# Patient Record
Sex: MALE | Race: OTHER | HISPANIC OR LATINO | URBAN - METROPOLITAN AREA
[De-identification: names, ages, dates, MRNs, and addresses within clinical notes are randomized per-mention and may not be internally consistent; named-entity substitution may affect disease eponyms.]

---

## 2024-06-08 ENCOUNTER — OUTPATIENT (OUTPATIENT)
Dept: EMERGENCY DEPT | Facility: HOSPITAL | Age: 29
LOS: 1 days | Discharge: HOME | End: 2024-06-08
Payer: MEDICAID

## 2024-06-08 VITALS
OXYGEN SATURATION: 96 % | HEART RATE: 54 BPM | SYSTOLIC BLOOD PRESSURE: 125 MMHG | RESPIRATION RATE: 18 BRPM | DIASTOLIC BLOOD PRESSURE: 75 MMHG | TEMPERATURE: 97 F

## 2024-06-08 VITALS
WEIGHT: 214.07 LBS | HEART RATE: 60 BPM | SYSTOLIC BLOOD PRESSURE: 134 MMHG | OXYGEN SATURATION: 98 % | DIASTOLIC BLOOD PRESSURE: 70 MMHG | RESPIRATION RATE: 18 BRPM | TEMPERATURE: 99 F

## 2024-06-08 DIAGNOSIS — K35.80 UNSPECIFIED ACUTE APPENDICITIS: ICD-10-CM

## 2024-06-08 LAB
ALBUMIN SERPL ELPH-MCNC: 4.6 G/DL — SIGNIFICANT CHANGE UP (ref 3.5–5.2)
ALP SERPL-CCNC: 109 U/L — SIGNIFICANT CHANGE UP (ref 30–115)
ALT FLD-CCNC: 19 U/L — SIGNIFICANT CHANGE UP (ref 0–41)
ANION GAP SERPL CALC-SCNC: 15 MMOL/L — HIGH (ref 7–14)
APPEARANCE UR: CLEAR — SIGNIFICANT CHANGE UP
AST SERPL-CCNC: 19 U/L — SIGNIFICANT CHANGE UP (ref 0–41)
BASOPHILS # BLD AUTO: 0.04 K/UL — SIGNIFICANT CHANGE UP (ref 0–0.2)
BASOPHILS NFR BLD AUTO: 0.4 % — SIGNIFICANT CHANGE UP (ref 0–1)
BILIRUB SERPL-MCNC: 1.8 MG/DL — HIGH (ref 0.2–1.2)
BILIRUB UR-MCNC: NEGATIVE — SIGNIFICANT CHANGE UP
BUN SERPL-MCNC: 21 MG/DL — HIGH (ref 10–20)
CALCIUM SERPL-MCNC: 9 MG/DL — SIGNIFICANT CHANGE UP (ref 8.4–10.5)
CHLORIDE SERPL-SCNC: 96 MMOL/L — LOW (ref 98–110)
CO2 SERPL-SCNC: 25 MMOL/L — SIGNIFICANT CHANGE UP (ref 17–32)
COLOR SPEC: SIGNIFICANT CHANGE UP
CREAT SERPL-MCNC: 0.8 MG/DL — SIGNIFICANT CHANGE UP (ref 0.7–1.5)
DIFF PNL FLD: NEGATIVE — SIGNIFICANT CHANGE UP
EGFR: 125 ML/MIN/1.73M2 — SIGNIFICANT CHANGE UP
EOSINOPHIL # BLD AUTO: 0.12 K/UL — SIGNIFICANT CHANGE UP (ref 0–0.7)
EOSINOPHIL NFR BLD AUTO: 1.1 % — SIGNIFICANT CHANGE UP (ref 0–8)
GLUCOSE SERPL-MCNC: 111 MG/DL — HIGH (ref 70–99)
GLUCOSE UR QL: NEGATIVE MG/DL — SIGNIFICANT CHANGE UP
HCT VFR BLD CALC: 42.3 % — SIGNIFICANT CHANGE UP (ref 42–52)
HGB BLD-MCNC: 14.4 G/DL — SIGNIFICANT CHANGE UP (ref 14–18)
IMM GRANULOCYTES NFR BLD AUTO: 0.4 % — HIGH (ref 0.1–0.3)
KETONES UR-MCNC: 40 MG/DL
LACTATE SERPL-SCNC: 0.9 MMOL/L — SIGNIFICANT CHANGE UP (ref 0.7–2)
LEUKOCYTE ESTERASE UR-ACNC: NEGATIVE — SIGNIFICANT CHANGE UP
LIDOCAIN IGE QN: 18 U/L — SIGNIFICANT CHANGE UP (ref 7–60)
LYMPHOCYTES # BLD AUTO: 1.65 K/UL — SIGNIFICANT CHANGE UP (ref 1.2–3.4)
LYMPHOCYTES # BLD AUTO: 14.6 % — LOW (ref 20.5–51.1)
MCHC RBC-ENTMCNC: 30 PG — SIGNIFICANT CHANGE UP (ref 27–31)
MCHC RBC-ENTMCNC: 34 G/DL — SIGNIFICANT CHANGE UP (ref 32–37)
MCV RBC AUTO: 88.1 FL — SIGNIFICANT CHANGE UP (ref 80–94)
MONOCYTES # BLD AUTO: 0.67 K/UL — HIGH (ref 0.1–0.6)
MONOCYTES NFR BLD AUTO: 5.9 % — SIGNIFICANT CHANGE UP (ref 1.7–9.3)
NEUTROPHILS # BLD AUTO: 8.77 K/UL — HIGH (ref 1.4–6.5)
NEUTROPHILS NFR BLD AUTO: 77.6 % — HIGH (ref 42.2–75.2)
NITRITE UR-MCNC: NEGATIVE — SIGNIFICANT CHANGE UP
NRBC # BLD: 0 /100 WBCS — SIGNIFICANT CHANGE UP (ref 0–0)
PH UR: 5.5 — SIGNIFICANT CHANGE UP (ref 5–8)
PLATELET # BLD AUTO: 274 K/UL — SIGNIFICANT CHANGE UP (ref 130–400)
PMV BLD: 9.3 FL — SIGNIFICANT CHANGE UP (ref 7.4–10.4)
POTASSIUM SERPL-MCNC: 4.3 MMOL/L — SIGNIFICANT CHANGE UP (ref 3.5–5)
POTASSIUM SERPL-SCNC: 4.3 MMOL/L — SIGNIFICANT CHANGE UP (ref 3.5–5)
PROT SERPL-MCNC: 7 G/DL — SIGNIFICANT CHANGE UP (ref 6–8)
PROT UR-MCNC: SIGNIFICANT CHANGE UP MG/DL
RBC # BLD: 4.8 M/UL — SIGNIFICANT CHANGE UP (ref 4.7–6.1)
RBC # FLD: 12.1 % — SIGNIFICANT CHANGE UP (ref 11.5–14.5)
SODIUM SERPL-SCNC: 136 MMOL/L — SIGNIFICANT CHANGE UP (ref 135–146)
SP GR SPEC: >1.03 — HIGH (ref 1–1.03)
UROBILINOGEN FLD QL: 1 MG/DL — SIGNIFICANT CHANGE UP (ref 0.2–1)
WBC # BLD: 11.29 K/UL — HIGH (ref 4.8–10.8)
WBC # FLD AUTO: 11.29 K/UL — HIGH (ref 4.8–10.8)

## 2024-06-08 PROCEDURE — 93970 EXTREMITY STUDY: CPT

## 2024-06-08 PROCEDURE — 88304 TISSUE EXAM BY PATHOLOGIST: CPT | Mod: 26

## 2024-06-08 PROCEDURE — C1889: CPT

## 2024-06-08 PROCEDURE — 88304 TISSUE EXAM BY PATHOLOGIST: CPT

## 2024-06-08 PROCEDURE — 93970 EXTREMITY STUDY: CPT | Mod: 26

## 2024-06-08 PROCEDURE — 99221 1ST HOSP IP/OBS SF/LOW 40: CPT

## 2024-06-08 PROCEDURE — 44970 LAPAROSCOPY APPENDECTOMY: CPT

## 2024-06-08 PROCEDURE — 99285 EMERGENCY DEPT VISIT HI MDM: CPT

## 2024-06-08 PROCEDURE — 74177 CT ABD & PELVIS W/CONTRAST: CPT | Mod: 26,MC

## 2024-06-08 PROCEDURE — 44970 LAPAROSCOPY APPENDECTOMY: CPT | Mod: AS

## 2024-06-08 PROCEDURE — C9399: CPT

## 2024-06-08 RX ORDER — ONDANSETRON 8 MG/1
4 TABLET, FILM COATED ORAL EVERY 6 HOURS
Refills: 0 | Status: DISCONTINUED | OUTPATIENT
Start: 2024-06-08 | End: 2024-06-08

## 2024-06-08 RX ORDER — PIPERACILLIN AND TAZOBACTAM 4; .5 G/20ML; G/20ML
3.38 INJECTION, POWDER, LYOPHILIZED, FOR SOLUTION INTRAVENOUS ONCE
Refills: 0 | Status: DISCONTINUED | OUTPATIENT
Start: 2024-06-08 | End: 2024-06-08

## 2024-06-08 RX ORDER — SODIUM CHLORIDE 9 MG/ML
1000 INJECTION, SOLUTION INTRAVENOUS
Refills: 0 | Status: DISCONTINUED | OUTPATIENT
Start: 2024-06-08 | End: 2024-06-08

## 2024-06-08 RX ORDER — PIPERACILLIN AND TAZOBACTAM 4; .5 G/20ML; G/20ML
3.38 INJECTION, POWDER, LYOPHILIZED, FOR SOLUTION INTRAVENOUS ONCE
Refills: 0 | Status: CANCELLED | OUTPATIENT
Start: 2024-06-09 | End: 2024-06-08

## 2024-06-08 RX ORDER — ACETAMINOPHEN 500 MG
650 TABLET ORAL EVERY 6 HOURS
Refills: 0 | Status: DISCONTINUED | OUTPATIENT
Start: 2024-06-08 | End: 2024-06-08

## 2024-06-08 RX ORDER — MORPHINE SULFATE 50 MG/1
4 CAPSULE, EXTENDED RELEASE ORAL ONCE
Refills: 0 | Status: DISCONTINUED | OUTPATIENT
Start: 2024-06-08 | End: 2024-06-08

## 2024-06-08 RX ORDER — MORPHINE SULFATE 50 MG/1
4 CAPSULE, EXTENDED RELEASE ORAL
Refills: 0 | Status: DISCONTINUED | OUTPATIENT
Start: 2024-06-08 | End: 2024-06-08

## 2024-06-08 RX ORDER — PIPERACILLIN AND TAZOBACTAM 4; .5 G/20ML; G/20ML
3.38 INJECTION, POWDER, LYOPHILIZED, FOR SOLUTION INTRAVENOUS ONCE
Refills: 0 | Status: COMPLETED | OUTPATIENT
Start: 2024-06-08 | End: 2024-06-08

## 2024-06-08 RX ORDER — MORPHINE SULFATE 50 MG/1
2 CAPSULE, EXTENDED RELEASE ORAL EVERY 4 HOURS
Refills: 0 | Status: DISCONTINUED | OUTPATIENT
Start: 2024-06-08 | End: 2024-06-08

## 2024-06-08 RX ORDER — SODIUM CHLORIDE 9 MG/ML
1000 INJECTION INTRAMUSCULAR; INTRAVENOUS; SUBCUTANEOUS
Refills: 0 | Status: DISCONTINUED | OUTPATIENT
Start: 2024-06-08 | End: 2024-06-08

## 2024-06-08 RX ORDER — PIPERACILLIN AND TAZOBACTAM 4; .5 G/20ML; G/20ML
3.38 INJECTION, POWDER, LYOPHILIZED, FOR SOLUTION INTRAVENOUS EVERY 8 HOURS
Refills: 0 | Status: CANCELLED | OUTPATIENT
Start: 2024-06-09 | End: 2024-06-08

## 2024-06-08 RX ORDER — ONDANSETRON 8 MG/1
4 TABLET, FILM COATED ORAL ONCE
Refills: 0 | Status: DISCONTINUED | OUTPATIENT
Start: 2024-06-08 | End: 2024-06-08

## 2024-06-08 RX ORDER — ACETAMINOPHEN 500 MG
2 TABLET ORAL
Qty: 0 | Refills: 0 | DISCHARGE
Start: 2024-06-08

## 2024-06-08 RX ORDER — SODIUM CHLORIDE 9 MG/ML
1000 INJECTION INTRAMUSCULAR; INTRAVENOUS; SUBCUTANEOUS ONCE
Refills: 0 | Status: COMPLETED | OUTPATIENT
Start: 2024-06-08 | End: 2024-06-08

## 2024-06-08 RX ORDER — IOHEXOL 300 MG/ML
30 INJECTION, SOLUTION INTRAVENOUS ONCE
Refills: 0 | Status: COMPLETED | OUTPATIENT
Start: 2024-06-08 | End: 2024-06-08

## 2024-06-08 RX ADMIN — MORPHINE SULFATE 4 MILLIGRAM(S): 50 CAPSULE, EXTENDED RELEASE ORAL at 12:11

## 2024-06-08 RX ADMIN — IOHEXOL 30 MILLILITER(S): 300 INJECTION, SOLUTION INTRAVENOUS at 04:00

## 2024-06-08 RX ADMIN — Medication 650 MILLIGRAM(S): at 17:56

## 2024-06-08 RX ADMIN — MORPHINE SULFATE 4 MILLIGRAM(S): 50 CAPSULE, EXTENDED RELEASE ORAL at 11:47

## 2024-06-08 RX ADMIN — MORPHINE SULFATE 4 MILLIGRAM(S): 50 CAPSULE, EXTENDED RELEASE ORAL at 12:05

## 2024-06-08 RX ADMIN — SODIUM CHLORIDE 1000 MILLILITER(S): 9 INJECTION INTRAMUSCULAR; INTRAVENOUS; SUBCUTANEOUS at 04:00

## 2024-06-08 RX ADMIN — MORPHINE SULFATE 4 MILLIGRAM(S): 50 CAPSULE, EXTENDED RELEASE ORAL at 12:30

## 2024-06-08 RX ADMIN — PIPERACILLIN AND TAZOBACTAM 200 GRAM(S): 4; .5 INJECTION, POWDER, LYOPHILIZED, FOR SOLUTION INTRAVENOUS at 06:49

## 2024-06-08 NOTE — PATIENT PROFILE ADULT - FALL HARM RISK - HARM RISK INTERVENTIONS

## 2024-06-08 NOTE — ED PROVIDER NOTE - PHYSICAL EXAMINATION
CONST: Well appearing in NAD  EYES: PERRL, EOMI, Sclera and conjunctiva clear.   ENT: Oropharynx normal appearing, no erythema or exudates. Uvula midline.  CARD: Normal S1 S2; Normal rate and rhythm  RESP: Equal BS B/L, No wheezes, rhonchi or rales. No distress  GI: Lower abdominal quadrants with tenderness to palpation.  Abdomen soft, nondistended.  No rebound or guarding.  MS: Normal ROM in all extremities. No midline spinal tenderness.  SKIN: Warm, dry, no acute rashes.   NEURO: A&Ox3, No focal deficits. Strength 5/5 with no sensory deficits. Steady gait

## 2024-06-08 NOTE — DISCHARGE NOTE PROVIDER - HOSPITAL COURSE
HPI:  · Chief Complaint: The patient is a 28y Male complaining of abdominal pain.  · HPI Objective Statement: 28-year-old male no reported past medical history presents to the ED complain abdominal pain.  Patient with 3 days of epigastric abdominal pain, non-radiating, no palliating factors.  Pain is worse with movement.  Pain worsened acutely this evening prompting today's ED eval.  Denies any nausea, vomiting, fevers, chills, diarrhea, dysuria, hematuria, penile or testicular pain.  (08 Jun 2024 07:02).      Patient had a CT scan abdomen and pelvis in ER which was c/w acute appendicitis.  Patient with WBC count of 11.2.   CT Abdomen and Pelvis w/ Oral Cont and w/ IV Cont (06.08.24 @ 06:28)   IMPRESSION:  Acute appendicitis without evidence of perforation or abscess.        6/8/2024:  Patient was admitted to surgical service under Dr. Hussein's care with diagnosis of acute appendicitis.    Patient was evaluated by Dr. Hussein and he recommended taking to the OR for Laparoscopic Appendectomy.  Patient was taken to the OR by Dr. Hussein and underwent a Laparoscopic Appendectomy on 6/8/2024.  Patient tolerated procedure well without complications.   Patient had some post-operative calf tenderness so a Venous duplex doppler was completed on 6/8/2024 and was negative for any DVT.   Patient's pain controlled with Tylenol,  vital signs remained stable and patient remained afebrile,  voided freely, + flatus,  tolerated diet without increased pain or N/V,  ambulated post operatively and was deemed stable for discharge by Dr. Hussein.    6/8/2024:  Discharged home in stable condition with no D/C Rx's needed and was instructed to follow up in 2 weeks with Dr. Gavin.

## 2024-06-08 NOTE — ED PROVIDER NOTE - OBJECTIVE STATEMENT
28-year-old male no reported past medical history presents to the ED complain abdominal pain.  Patient with 3 days of epigastric abdominal pain, nonradiating, no palliating factors.  Pain is worse with movement.  Pain worsened acutely this evening prompting today's ED eval.  Denies any nausea, vomiting, fevers, chills, diarrhea, dysuria, hematuria, penile or testicular pain.

## 2024-06-08 NOTE — H&P ADULT - NSHPPHYSICALEXAM_GEN_ALL_CORE
Vital Signs Last 24 Hrs  T(C): 36.6 (08 Jun 2024 06:22), Max: 37.1 (08 Jun 2024 03:37)  T(F): 97.9 (08 Jun 2024 06:22), Max: 98.7 (08 Jun 2024 03:37)  HR: 60 (08 Jun 2024 06:22) (60 - 60)  BP: 127/76 (08 Jun 2024 06:22) (127/76 - 134/70)  BP(mean): --  RR: 18 (08 Jun 2024 06:22) (18 - 18)  SpO2: 100% (08 Jun 2024 06:22) (98% - 100%)    Parameters below as of 08 Jun 2024 06:22  Patient On (Oxygen Delivery Method): room air

## 2024-06-08 NOTE — DISCHARGE NOTE PROVIDER - NSDCFUADDINST_GEN_ALL_CORE_FT
- Diet: Continue your regular diet. - Drink plenty of fluids. - Ambulate frequently at home.   - No further antibiotics needed.  - Dressings:  You may use bandaids to cover incisions as needed. Ok to shower normally on Monday.  No soaking in bath/pool/ocean for 6 weeks. - Pain: Take Ibuprofen 500mg and/or Tylenol 650mg as needed for pain.  - Activity:  No weight lifting and Avoid heavy lifting (anything over 10 pounds) for at least 6 weeks.  You may do light cardio exercise in 1 week if feel well.  - Follow up:  You should call Dr. Gavin's office on Monday to arrange a follow up appointment for 2 weeks.    * Return to Emergency room if develop any persistent fever > 101, chills, tremors, persistent nausea/vomiting, severe pain not relieved by pain medication, inability to urinate, chest pains, difficulty breathing or any bleeding.

## 2024-06-08 NOTE — H&P ADULT - HISTORY OF PRESENT ILLNESS
· Chief Complaint: The patient is a 28y Male complaining of abdominal pain.  · HPI Objective Statement: 28-year-old male no reported past medical history presents to the ED complain abdominal pain.  Patient with 3 days of epigastric abdominal pain, nonradiating, no palliating factors.  Pain is worse with movement.  Pain worsened acutely this evening prompting today's ED eval.  Denies any nausea, vomiting, fevers, chills, diarrhea, dysuria, hematuria, penile or testicular pain.

## 2024-06-08 NOTE — H&P ADULT - NS ATTEND AMEND GEN_ALL_CORE FT
Patient seen and examined with surgery PA in preop area and discussed management plans.. Localized RLQ tenderness mild. Lab and CT exams reviewed consistent with acute appendicitis. Informed consent was obtained patient to go to OR for lap appendectomy Patient seen and examined with surgery PA in ED, parents at bedside, 0815 hrs,  and discussed management plans.. Localized RLQ tenderness mild. Lab and CT exams reviewed consistent with acute appendicitis. Informed consent was obtained. Patient to go to OR for lap appendectomy later today with Dr. Hussein.  All questions answsered and they are happy with the assessment and plan.  Case also d/w Dr. Hussein this am

## 2024-06-08 NOTE — PROGRESS NOTE ADULT - SUBJECTIVE AND OBJECTIVE BOX
Post Operative Note --> s/p Laparoscopic Appendectomy today.      Patient reports feels well.  Mild incisional pain.  Tolerated diet without pain or N/V.   Voided freely.  Denies N/V, subjective fever, chills, tremors, CP or SOB.         Vitals: T(F): 97.6 (06-08-24 @ 13:53), Max: 98.9 (06-08-24 @ 11:35)  HR: 97 (06-08-24 @ 13:53)  BP: 114/63 (06-08-24 @ 13:53) (104/63 - 134/70)  RR: 18 (06-08-24 @ 13:53)  SpO2: 99% (06-08-24 @ 13:53)        I&O's:  In:   06-08-24 @ 07:01  -  06-08-24 @ 15:56  --------------------------------------------------------  IN: 100 mL      IV Fluids: lactated ringers. 1000 milliLiter(s) (100 mL/Hr) IV Continuous <Continuous>      Aden Catheter:  No     Drains: None    NG Tube:  None           Physical Examination:  General:  Awake, alert and conversant in NAD.   Lungs:  CTA bilaterally, No W/R/R.  Cor:  S1 & S2 RRR, No M/R/G.  Abd:  + BS, soft , no distention, Mild incisional tenderness with palpation.  Incisions with dressings C/D/I.  No rebound, guarding or peritoneal signs.   :  No CVAT, No suprapubic tenderness.    Ext:  No C/C/E,  No calf tenderness.  Neuro:  No focal deficits, no facial droop.          Medications: [Standing]  lactated ringers. 1000 milliLiter(s) IV Continuous <Continuous>  morphine  - Injectable 4 milliGRAM(s) IV Push every 10 minutes PRN  ondansetron Injectable 4 milliGRAM(s) IV Push once PRN    Medications: [PRN]  lactated ringers. 1000 milliLiter(s) IV Continuous <Continuous>  morphine  - Injectable 4 milliGRAM(s) IV Push every 10 minutes PRN  ondansetron Injectable 4 milliGRAM(s) IV Push once PRN          Labs:  No Post-op labs ordered.      Imaging:  No post-op imaging studies      
called   by   vascular   fellow   dr Ospina  ble  venous  duplex  read  as  negative    ok to  discharge patient to  home

## 2024-06-08 NOTE — ED PROVIDER NOTE - CLINICAL SUMMARY MEDICAL DECISION MAKING FREE TEXT BOX
28-year-old male, no past medical history, presents with epigastric pain pain for the past few days.  Tender on left lower quadrant and right lower quadrant on exam.  Labs noted for WBC 11.2, BUN 21, creatinine 0.8, lipase 18, lactate 0.9.  Urinalysis negative.  CT abdomen consistent with acute appendicitis.  Given IV fluids, morphine and Zosyn.  Will admit to surgery.

## 2024-06-08 NOTE — DISCHARGE NOTE NURSING/CASE MANAGEMENT/SOCIAL WORK - PATIENT PORTAL LINK FT
You can access the FollowMyHealth Patient Portal offered by [facility] by registering at the following website: [email or web address]. By joining [employer]’s FollowMyHealth portal, you will also be able to view your health information using other applications (apps) compatible with our system.

## 2024-06-08 NOTE — H&P ADULT - NSHPLABSRESULTS_GEN_ALL_CORE
14.4   11.29 )-----------( 274      ( 08 Jun 2024 04:11 )             42.3   06-08    136  |  96<L>  |  21<H>  ----------------------------<  111<H>  4.3   |  25  |  0.8    Ca    9.0      08 Jun 2024 04:11    TPro  7.0  /  Alb  4.6  /  TBili  1.8<H>  /  DBili  x   /  AST  19  /  ALT  19  /  AlkPhos  109  06-08  ******PRELIMINARY REPORT******      ******PRELIMINARY REPORT******         ACC: [id number] EXAM:  CT ABDOMEN AND PELVIS OC IC   ORDERED BY: MILAGRO PINEDO     PROCEDURE DATE:  06/08/2024    ******PRELIMINARY REPORT******      ******PRELIMINARY REPORT******           INTERPRETATION:  CLINICAL STATEMENT: Lower abdominal pain    TECHNIQUE: Contiguous axial CT images were obtained from the lower chest   to the pubic symphysis following administration of intravenous contrast.    95 cc administered of Omnipaque 350 (5 cc discarded).  Oral contrast was   administered.  Reformatted images in the coronal and sagittal planes were   acquired.    COMPARISON : None.      FINDINGS:    LOWER CHEST: Unremarkable.    HEPATOBILIARY: Unremarkable.    SPLEEN:Unremarkable.    PANCREAS: Unremarkable.    ADRENAL GLANDS: Unremarkable.    KIDNEYS: No hydronephrosis.    ABDOMINOPELVIC NODES: Scattered reactive lymph nodes in the right lower   quadrant.    PELVIC ORGANS: Unremarkable.    PERITONEUM/MESENTERY/BOWEL: Dilated fluid-filled appendix measuring   approximately 1.3 cm in diameter with wall thickening and adjacent   inflammatory change. No drainable fluid collection or pneumoperitoneum.   There is trace free pelvic fluid, likely reactive. No bowel obstruction.    BONES/SOFT TISSUES: No acute osseous abnormalities.      IMPRESSION:    Acute appendicitis without evidence of perforation or abscess.        ******PRELIMINARY REPORT******      ******PRELIMINARY REPORT******       TRAE SANCHEZ MD; Resident Radiologist  This document is a PRELIMINARY interpretation and is pending final   attending approval. Jun 8 2024  6:35AM

## 2024-06-08 NOTE — BRIEF OPERATIVE NOTE - COMMENTS
28yMale presents for acute appendicitis    No qualified surgical residents were available to assist therefore I was first assist in the above procedure with Dr. Hussein    the procedure was completed and the pt was stable and transferred to PACU. 28y Male presents for acute appendicitis    No qualified surgical residents were available to assist therefore I was first assist in the above procedure with Dr. Hussein    the procedure was completed and the pt was stable and transferred to PACU.

## 2024-06-08 NOTE — ED PROVIDER NOTE - ATTENDING APP SHARED VISIT CONTRIBUTION OF CARE
28-year-old male, no past medical history, presents with epigastric pain for the past 3 days.  Pain worse with movement.  No fever, vomiting or diarrhea.  No prior surgeries.  Exam shows alert patient in no distress, HEENT NCAT PERRL, neck supple, lungs clear, RR S1S2, abdomen soft +LLQ/RLQ tenderness +BS, no CCE.

## 2024-06-08 NOTE — ED ADULT NURSE NOTE - NSFALLUNIVINTERV_ED_ALL_ED
Bed/Stretcher in lowest position, wheels locked, appropriate side rails in place/Call bell, personal items and telephone in reach/Instruct patient to call for assistance before getting out of bed/chair/stretcher/Non-slip footwear applied when patient is off stretcher/[place] to call system/Physically safe environment - no spills, clutter or unnecessary equipment/Purposeful proactive rounding/Room/bathroom lighting operational, light cord in reach

## 2024-06-08 NOTE — PROGRESS NOTE ADULT - ASSESSMENT
Assessment:  28y Male patient S/P Laparoscopic Appendectomy by Dr. Hussein today.       Plan:  - Diet:  Resume regular diet.   - Ivabx:  Patient received 2 doses per protocol   - Pain medications PRN.   - VTE prophylaxis:  Low risk, Ambulation, SCD's.  - Encouraged OOB to chair and ambulate.    - Patient seen with Dr. Hussien post-operatively and he states patient can be discharged home this afternoon.  No antibiotics needed and patient can follow up with Dr. Gavin in 2 weeks.    - Patient was instructed by Dr. Hussein about post-op instructions, including a light diet until bowel movement, no heavy lifting x 6 weeks and can do light cardio exercising in 1 week if feels well.    - Patient verbalizes understanding of plan & instructions and all questions answered to patient's satisfaction.   - Discharge home today.

## 2024-06-08 NOTE — CHART NOTE - NSCHARTNOTEFT_GEN_A_CORE
Patient now c/o bilateral calf pain.    B/L Calfs soft, no erythema, + tenderness with palpation, no palpable cords or erythema.    Will obtain a B/L lower extremity duplex doppler now prior to discharge to r/o DVT.    If doppler negative for DVT, then discharge home as planned.

## 2024-06-08 NOTE — DISCHARGE NOTE PROVIDER - NSDCCPCAREPLAN_GEN_ALL_CORE_FT
PRINCIPAL DISCHARGE DIAGNOSIS  Diagnosis: Acute appendicitis  Assessment and Plan of Treatment: .  * You were diagnosed with acute appendicitis and had a Laparoscopic Appendectomy on 6/8/2024 by Dr. Hussein.   - Diet: Continue your regular diet.  - Drink plenty of fluids.  - Ambulate frequently at home.    - No further antibiotics needed.   - Dressings:  You may use bandaids to cover incisions as needed. Ok to shower normally on Monday.  No soaking in bath/pool/ocean for 6 weeks.  - Pain: Take ibuprofen 500mg and/or tylenol 650mg as needed for pain.   - Activity:  No weight lifting and Avoid heavy lifting (anything over 10 pounds) for at least 6 weeks.  You may do light cardio exercise in 1 week if feel well.   - Follow up:  You should call Dr. Gavin's office on Monday to arrange a follow up appointment for 2 weeks.

## 2024-06-08 NOTE — DISCHARGE NOTE PROVIDER - CARE PROVIDER_API CALL
Maury Gavin  Surgery  [address] [place] [id number]  Phone: [phone]  Fax: [phone]  Follow Up Time: 2 weeks

## 2024-06-12 LAB — SURGICAL PATHOLOGY STUDY: SIGNIFICANT CHANGE UP
